# Patient Record
(demographics unavailable — no encounter records)

---

## 2025-02-10 NOTE — PHYSICAL EXAM
[FreeTextEntry1] : Patient is alert, oriented to name and location. Not oriented to time and year. Speech is slow, no significant dysarthria. Patient needs repetition of commands but is able to follow and some cues. Cranial nerves II-XII are intact. R eye ptosis, baseline R eye blindness. Motor exam reveals intact strength with individual muscle testing in bilateral upper and lower extremities except for TUE 3/5, RLE 3/5. Tone is normal. Reflexes are normal. Sensation is intact to light touch in distal extremities. Finger-to-nose and heel-to-shin are intact except for R finger dysmetria. Rapid alternating movements are normal in the upper and lower extremities. Unable to assess gait.

## 2025-02-10 NOTE — ASSESSMENT
[FreeTextEntry1] : Murray Hooper 75-year-old male with a PMH of HTN, T2DM, and recent admission for acute R cerebellar stroke ([facility] territory) due to moderate to severe extracranial vascular disease and ICAD, specifically R V4 occlusive disease/basilar stenosis (discharged on DAPT). Patient readmitted for stroke r/o after presenting with possible new R facial droop and AMS (likely related to UTI). Repeat MRI without new ischemia. Patient now presents to the office for post hospitalization follow-up.] Stroke etiology possibly from ICAD  Patient needs extensive PT/OT regimen as he is not getting that from rehab, weakness and worsening confusion might also be from UTI, now on abx, saldana catheter exchanged.   Plan: -Recommend PT/OT -If patient gets discharge home, will live with his son, will need [facility] care -Continue current medication regimen for secondary stroke prevention; DAPT ASA and Plavix 75mg x3 months due to ICAD -Continue Lipitor 80mg daily for LDL goal <70 -Continue aggressive vascular risk factor control with PCP, BP goal permissive d/t ICAD, prevent low BP -Counselled on healthy eating (DASH/Mediterranean diet, limiting red meats and fried foods) -Counselled on importance of regular exercise and remaining active -Counselled on f/u with PCP regarding regular health maintenance and prevention, including routine screening -Counselled on signs of stroke BEFAST and to call 911 with any new or worsening neurological symptoms -RTC in 3 months

## 2025-02-10 NOTE — ASSESSMENT
[FreeTextEntry1] : Murray Hooper 75-year-old male with a PMH of HTN, T2DM, and recent admission for acute R cerebellar stroke ([facility] territory) due to moderate to severe extracranial vascular disease and ICAD, specifically R V4 occlusive disease/basilar stenosis (discharged on DAPT). Patient readmitted for stroke r/o after presenting with possible new R facial droop and AMS (likely related to UTI). Repeat MRI without new ischemia. Patient now presents to the office for post hospitalization follow-up.] Stroke etiology possibly from ICAD  Patient needs extensive PT/OT regimen as he is not getting that from rehab, weakness and worsening confusion might also be from UTI, now on abx, saldana catheter exchanged.   Plan: -Recommend PT/OT -If patient gets discharge home, will live with his son, will need [facility] care -Continue current medication regimen for secondary stroke prevention; DAPT ASA and Plavix 75mg x3 months due to ICAD -Continue Lipitor 80mg daily for LDL goal <70 -Continue aggressive vascular risk factor control with PCP, BP goal permissive d/t ICAD, prevent low BP -Counselled on healthy eating (DASH/Mediterranean diet, limiting red meats and fried foods) -Counselled on importance of regular exercise and remaining active -Counselled on f/u with PCP regarding regular health maintenance and prevention, including routine screening -Counselled on signs of stroke BEFAST and to call 911 with any new or worsening neurological symptoms -RTC in 3 months

## 2025-02-10 NOTE — HISTORY OF PRESENT ILLNESS
[FreeTextEntry1] : Murray Hooper is a 76-year-old Male with PMHx of HTN, HLD, cognitive decline, DM, recent R cerebellar infarct with stroke admission () presents to [facility] from subacute rehab with HA x 3 days, new R facial droop, and confusion. CTH showed no acute infarction or hemorrhage.Pt now presents to the office post-hospitalization follow-up. He is accompanied by his son  First hospital course: 25-25 for new acute R cerebellar CVA.  25 CTA H/N: severe stenosis of R vertebral artery from its origin up to V3 segment, with subsequent focal occlusion suggested proximal to the basilar confluence. These findings were incompletely visualized on the prior MRA from 2021, and the degree of proximal vertebral artery stenosis remains age-determinate. Findings might suggest chronically hypoplastic appearance versus age-indeterminate occlusion.  25 CTH: no acute intracranial hemorrhage, mass effect or midline shift.  25 MRI H: No acute infarction. Mild periventricular and pontine chronic white matter ischemia.  1/3/25 CTH small hypodensity in the medial right cerebellar hemisphere though to represent patient's known acute cerebellar infarct on recent MRI. chronic small vessel disease.  25 MRI H: large area of acute/subacute ischemia in the right paramedian cerebellum/vermis and new focus of acute/subacute ischemia within the right dorsolateral medulla. No acute intracranial hemorrhage 25 CTH: evolving acute infarct in the right paramedian cerebellar hemisphere without alberto evidence of hemorrhagic transformation at this time. 2). Hypodensity in the chele, more prominent compared to prior imaging. Chronic small vessel disease. TTE: normal LV function, LV wall thickness is borderline increased.  25 BLE Doppler: negative for DVT Labs: LDL 95, a1c 9.3%, TSH 1.280  Second Hospital Course: -2025, Seen by Dr. Shafer Imagin25 CT brain protocol: gray/white matter differentiation is preserved. No acute intracranial hemorrhage. Ischemic changes appreciated along the medial aspect of the right cerebellum and right cerebellar vermis, with decreased conspicuity compared with earlier ct. 25: MRI H: subacute-chronic evolving infarction within the right posterolateral medulla and right medial cerebellar hemisphere vermis. No acute intracranial hemorrhage or new areas of acute ischemia. Labs: PRU: 377 P2Y12: 138  Patient was discharged to [place] rehab. His son reports, he is not great in rehab. He does OT/PT 5mins/day, but on ROM in bed. They have not started ambulating patient since he's been there for the past two weeks. He is on a wheelchair today. His memory is worse (also has UTI, has a saldana catheter, was just changed, he is on abx).  Patient's son reports appetite is low, denies choking episodes. Sleep was reportedly good.  He is taking ASA, Plavix and Rosuvastatin 40mg daily. Patient's son reports he has nausea often and dizziness. BP today is 155/75 (Lisinopril dose was decreased from 20 to 10mg?) Today he was unable to do his carotid US and TCDs d/t nausea. Nausea subsided by the time I saw patient in room. Pt son's report he snores but does not have sleep apnea. He was never a smoker, does not drink alcohol and denies drug use (has done edible gummies for sleep, very rare prior to admission).  
[FreeTextEntry1] : Murray Hooper is a 76-year-old Male with PMHx of HTN, HLD, cognitive decline, DM, recent R cerebellar infarct with stroke admission () presents to [facility] from subacute rehab with HA x 3 days, new R facial droop, and confusion. CTH showed no acute infarction or hemorrhage.Pt now presents to the office post-hospitalization follow-up. He is accompanied by his son  First hospital course: 25-25 for new acute R cerebellar CVA.  25 CTA H/N: severe stenosis of R vertebral artery from its origin up to V3 segment, with subsequent focal occlusion suggested proximal to the basilar confluence. These findings were incompletely visualized on the prior MRA from 2021, and the degree of proximal vertebral artery stenosis remains age-determinate. Findings might suggest chronically hypoplastic appearance versus age-indeterminate occlusion.  25 CTH: no acute intracranial hemorrhage, mass effect or midline shift.  25 MRI H: No acute infarction. Mild periventricular and pontine chronic white matter ischemia.  1/3/25 CTH small hypodensity in the medial right cerebellar hemisphere though to represent patient's known acute cerebellar infarct on recent MRI. chronic small vessel disease.  25 MRI H: large area of acute/subacute ischemia in the right paramedian cerebellum/vermis and new focus of acute/subacute ischemia within the right dorsolateral medulla. No acute intracranial hemorrhage 25 CTH: evolving acute infarct in the right paramedian cerebellar hemisphere without alberto evidence of hemorrhagic transformation at this time. 2). Hypodensity in the chele, more prominent compared to prior imaging. Chronic small vessel disease. TTE: normal LV function, LV wall thickness is borderline increased.  25 BLE Doppler: negative for DVT Labs: LDL 95, a1c 9.3%, TSH 1.280  Second Hospital Course: -2025, Seen by Dr. Shafer Imagin25 CT brain protocol: gray/white matter differentiation is preserved. No acute intracranial hemorrhage. Ischemic changes appreciated along the medial aspect of the right cerebellum and right cerebellar vermis, with decreased conspicuity compared with earlier ct. 25: MRI H: subacute-chronic evolving infarction within the right posterolateral medulla and right medial cerebellar hemisphere vermis. No acute intracranial hemorrhage or new areas of acute ischemia. Labs: PRU: 377 P2Y12: 138  Patient was discharged to [place] re[facility]. His son reports, he is not great in rehab. He does OT/PT 5mins/day, but on ROM in bed. They have not started ambulating patient since he's been there for the past two weeks. He is on a wheelchair today. His memory is worse (also has UTI, has a saldana catheter, was just changed, he is on abx).  Patient's son reports appetite is low, denies choking episodes. Sleep was reportedly good.  He is taking ASA, Plavix and Rosuvastatin 40mg daily. Patient's son reports he has nausea often and dizziness. BP today is 155/75 (Lisinopril dose was decreased from 20 to 10mg?) Today he was unable to do his carotid US and TCDs d/t nausea. Nausea subsided by the time I saw patient in room. Pt son's report he snores but does not have sleep apnea. He was never a smoker, does not drink alcohol and denies drug use (has done edible gummies for sleep, very rare prior to admission).  
none

## 2025-02-10 NOTE — REASON FOR VISIT
[Post Hospitalization] : a post hospitalization visit [Family Member] : family member [Other: _____] : [unfilled] [FreeTextEntry1] : stroke

## 2025-07-11 NOTE — ASSESSMENT
[FreeTextEntry1] : Mr. Murray Hooper is a 76y Male with PMHx of HTN, HLD, T2DM, CVA 1/2025 (R cerebellar infarct) with R facial droop, indwelling Herrera catheter d/t urinary retention (since CVA), on DAPT, who was admitted to the medicine service due to worsening altered mental status and confusion x 5 days, with UA findings concerning for a UTI. During admission, MRI H confirmed acute left frontal infarct as well as 2 subdural hematomas/hygromas. He presents to the office for post-hospitalization follow-up.  Etiology of new infarct may be due to ICAD or a possible cardioembolic cause. As the stroke is small it is unlikely to be causing patient's confusion.   Plan: -Refer to EP for ILR Placement vs extended cardiac monitor to r/o afib -can consider EEG if confusion continues -Consider cardiac ct to r/o LV thrombus -Continue current medication regimen for secondary stroke prevention; DAPT x 21 days then monotherapy with ASA only. -Continue aggressive vascular risk factor control with PCP, BP goal <130/80 -Counselled on healthy eating (DASH/Mediterranean diet, limiting red meats and fried foods) -Counselled on importance of regular exercise and remaining active -Counselled on f/u with PCP regarding regular health maintenance and prevention, including routine screening -Counselled on signs of stroke BEFAST and to call 911 with any new or worsening neurological symptoms

## 2025-07-11 NOTE — HISTORY OF PRESENT ILLNESS
[FreeTextEntry1] : Mr. Murray Hooper is a 76y Male with PMHx of HTN, HLD, T2DM, CVA 1/2025 (R cerebellar infarct) with R facial droop, indwelling Herrera catheter d/t urinary retention (since CVA), on DAPT, who was admitted to the medicine service due to worsening altered mental status and confusion x 5 days, with UA findings concerning for a UTI. During admission, MRI H confirmed acute left frontal infarct as well as 2 subdural hematomas/hygromas. He presents to the office for post-hospitalization follow-up.  Patient was last seen in the office February 10, 2025 for post hospitalization follow-up. During that time, plan was to DAPT ASA and Plavix for 3 months due to ICAD. Patient has not been able to follow-up since then. Stroke etiology at that time was possibly from ICAD.  Hospital Course June 20-26, 2025 Seen by Dr. Shafer Imaging: -MRI H: small focus of restricted diffusion in the left frontal lobe with associated T2 prolongation, consistent with an acute infarct. Chronic right holohemispheric subdural hematoma/hygroma with a maximal depth of 0.3cm. Trace chronic subdural hematoma/hygroma along the left occipital lobe with a maximal depth of 0.2cm. -CTA head and neck (6/22): Incidental bilateral upper lobe segmental pulmonary emboli. 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 Multifocal moderate to severe stenosis of the distal left vertebral artery and basilar artery which is stable from prior exam. Multifocal intracranial atherosclerotic disease including involvement of the bilateral PCA and MCA stable from prior exam.  - Bilateral LE dopplers neg for DVT on 6/24 - ARU and PRU obtained on 6/26 and pt is therapeutic.

## 2025-07-17 NOTE — HISTORY OF PRESENT ILLNESS
[FreeTextEntry1] : Recent [facility] for UTI; Glucose today 352 Insulin regime as outlined  [de-identified] : Looks poor

## 2025-07-17 NOTE — ASSESSMENT
[FreeTextEntry1] : Looks poor ;  Will admit to hospital UTI  Bronchitis  Poorly controlled blood sugar

## 2025-07-17 NOTE — PHYSICAL EXAM
[Normal] : affect was normal and insight and judgment were intact [de-identified] : Looks poor  [de-identified] : Decreased breath sounds